# Patient Record
(demographics unavailable — no encounter records)

---

## 2024-10-28 NOTE — ASSESSMENT
[FreeTextEntry1] : Assessment and plan:  1.  Morbid obesity BMI is greater than 40 in fact is 42.97 detailed discussion with patient regarding weight loss program.  Patient is also diabetic therefore we had a long and detailed discussion regarding the weight loss program as stated and she is a candidate for GLP-1 agonist not only for the weight loss but also for diabetes.  Mounjaro prescribed at lowest dose 2.5 subsequently titrate up monthly until she reaches maximum dose.  Patient had multiple questions which I answered to the best of my capability and to the patient's satisfaction.  2.  Diabetes mellitus type 2 due to the combination of obesity and diabetes patient will be starting Mounjaro and she will continue no concentrated sweets consistent carbohydrate diet.  3.  Status post right total knee replacement patient healing well patient is scheduled for orthopedic evaluation of left ankle.  Patient informs me that she will not be having total ankle replacement due to possible side effects and may require multiple surgeries.  4.  Hypertension today's blood pressure 134/76  excellent blood pressure control for patient continue present medical management without change amlodipine 5 mg, losartan hydrochlorothiazide 100-25 mg daily.  5.  Comprehensive blood work drawn in office by examiner.  And at today's visit Fluzone influenza vaccine administered left deltoid.    Total time spent face-to-face and non-face-to-face time 40 minutes the majority of which was spent on counseling and coordination of care.

## 2024-10-28 NOTE — HEALTH RISK ASSESSMENT
[Yes] : Yes [Monthly or less (1 pt)] : Monthly or less (1 point) [1 or 2 (0 pts)] : 1 or 2 (0 points) [Never (0 pts)] : Never (0 points) [No] : In the past 12 months have you used drugs other than those required for medical reasons? No [No falls in past year] : Patient reported no falls in the past year [Little interest or pleasure doing things] : 1) Little interest or pleasure doing things [Feeling down, depressed, or hopeless] : 2) Feeling down, depressed, or hopeless [0] : 2) Feeling down, depressed, or hopeless: Not at all (0) [PHQ-2 Negative - No further assessment needed] : PHQ-2 Negative - No further assessment needed [With Patient/Caregiver] : , with patient/caregiver [Reviewed no changes] : Reviewed, no changes [Designated Healthcare Proxy] : Designated healthcare proxy [Name: ___] : Health Care Proxy's Name: [unfilled]  [Relationship: ___] : Relationship: [unfilled] [Aggressive treatment] : aggressive treatment [I will adhere to the patient's wishes.] : I will adhere to the patient's wishes. [Never] : Never [de-identified] : Occasionally at social events. [Audit-CScore] : 1 [[id number]] : 0 [AdvancecareDate] : 10/24

## 2024-10-28 NOTE — HISTORY OF PRESENT ILLNESS
[FreeTextEntry1] : Follow-up, disease management, health maintenance, reconciliation of medications. [de-identified] : Patient is a 64-year-old female who presents today for follow-up and disease management.  Medical history significant for diabetes mellitus type 2, hyperlipidemia, hypertension, inflammatory arthritis, rosacea and elevated BMI.  Patient recently seen by dermatology history of rosacea.  Presently patient is awake alert and oriented x 3 she is in no acute distress calm, cooperative, well-groomed and nourished.

## 2024-10-28 NOTE — REVIEW OF SYSTEMS
[Joint Pain] : joint pain [Joint Stiffness] : joint stiffness [Negative] : Heme/Lymph [FreeTextEntry9] : Post right total knee replacement healing well complaining of severe left ankle pain [de-identified] : Paresthesia of both hands since carpal tunnel release surgery of left wrist paresthesia has resolved in the left hand persists in right hand patient is scheduled for carpal tunnel release surgery of right wrist February 17.

## 2024-10-28 NOTE — PHYSICAL EXAM
[No Acute Distress] : no acute distress [Well Nourished] : well nourished [Well Developed] : well developed [Well-Appearing] : well-appearing [Normal Voice/Communication] : normal voice/communication [Normal Sclera/Conjunctiva] : normal sclera/conjunctiva [PERRL] : pupils equal round and reactive to light [EOMI] : extraocular movements intact [Normal Outer Ear/Nose] : the outer ears and nose were normal in appearance [Normal Oropharynx] : the oropharynx was normal [No JVD] : no jugular venous distention [No Lymphadenopathy] : no lymphadenopathy [Supple] : supple [Thyroid Normal, No Nodules] : the thyroid was normal and there were no nodules present [No Respiratory Distress] : no respiratory distress  [No Accessory Muscle Use] : no accessory muscle use [Clear to Auscultation] : lungs were clear to auscultation bilaterally [Normal Rate] : normal rate  [Regular Rhythm] : with a regular rhythm [Normal S1, S2] : normal S1 and S2 [No Murmur] : no murmur heard [No Carotid Bruits] : no carotid bruits [No Abdominal Bruit] : a ~M bruit was not heard ~T in the abdomen [No Varicosities] : no varicosities [Pedal Pulses Present] : the pedal pulses are present [No Edema] : there was no peripheral edema [No Palpable Aorta] : no palpable aorta [No Extremity Clubbing/Cyanosis] : no extremity clubbing/cyanosis [Soft] : abdomen soft [Non Tender] : non-tender [Non-distended] : non-distended [No Masses] : no abdominal mass palpated [No HSM] : no HSM [Normal Bowel Sounds] : normal bowel sounds [No CVA Tenderness] : no CVA  tenderness [No Spinal Tenderness] : no spinal tenderness [No Joint Swelling] : no joint swelling [Grossly Normal Strength/Tone] : grossly normal strength/tone [No Rash] : no rash [Coordination Grossly Intact] : coordination grossly intact [No Focal Deficits] : no focal deficits [Normal Gait] : normal gait [Deep Tendon Reflexes (DTR)] : deep tendon reflexes were 2+ and symmetric [Speech Grossly Normal] : speech grossly normal [Memory Grossly Normal] : memory grossly normal [Normal Affect] : the affect was normal [Alert and Oriented x3] : oriented to person, place, and time [Normal Mood] : the mood was normal [Normal Insight/Judgement] : insight and judgment were intact [Comprehensive Foot Exam Normal] : Right and left foot were examined and both feet are normal. No ulcers in either foot. Toes are normal and with full ROM.  Normal tactile sensation with monofilament testing throughout both feet [de-identified] : Status post right total knee replacement incision healed well patient ambulating with cane but doing well, left ankle pain persists. [de-identified] : Incision right knee healed well. [de-identified] : Positive Tinel's right

## 2025-01-11 NOTE — ASSESSMENT
[FreeTextEntry1] : Alert, oriented, well, pleasant.  erythematous scaly papules anterior neck and upper chest surrounding neck. Dermatitis, ? contact. Better after switching to synthetic scarf. start betamethasone cream twice per day for 2 weeks.  f/u if not resolved.  Have scarves cleaned prior to reusing. f/u 4 weeks if not resolved.  erythema cheeks and forehead. Rosacea. increase metronidazole gel to twice per day. Change to decaf every other cup of coffee.  3.5mm flesh colored papule thin right nose tip. Neoplasm uncertain. ?Nevus. Recheck 4 months. Biopsy risks and side effects including scarring discussed.  Describes bright red erythema face with desquamatory scaling immediately after knee surgery.  Pain medicine changed. Resolved. No present elsewhere on body. Rash. ? contact.  May happen at next surgery. Remind doctors of the reaction prior to the operation.  4 months.

## 2025-01-11 NOTE — HISTORY OF PRESENT ILLNESS
[FreeTextEntry1] : itchy rash chest for 3 weeks. Rosacea f/u. bump on right nose tip. Facial rash after knee surgery recently. Next knee surgery pending. [de-identified] : New pashmina scarves on neck and chest then rash began. Scarves not dry cleaned before wearing. Hot showers and 4 large cups coffee daily. More than 1 year bump right nose tip without change. Bright red face with peeling immediately after surgey, told due to oxycodone. Resolved.

## 2025-02-05 NOTE — REVIEW OF SYSTEMS
[Joint Pain] : joint pain [Joint Stiffness] : joint stiffness [Negative] : Heme/Lymph [FreeTextEntry9] : Post right total knee replacement healing well complaining of severe left ankle pain [de-identified] : Paresthesia of both hands since carpal tunnel release surgery of left wrist paresthesia has resolved in the left hand persists in right hand patient is scheduled for carpal tunnel release surgery of right wrist February 17.

## 2025-02-05 NOTE — ASSESSMENT
[FreeTextEntry1] : Assessment and plan:  1.  Morbid obesity patient has been on Mounjaro with good results has lost a total of 23 pounds continue present medical management and also diet.  Patient will be having left total knee replacement in 2 weeks at [facility] Mounjaro will be held.  2.  Diabetes mellitus type 2 due to the combination of obesity and diabetes patient will be starting Mounjaro and she will continue no concentrated sweets consistent carbohydrate diet.  3.  Status post right total knee replacement patient healing well patient is scheduled for orthopedic evaluation of left ankle.  Patient informs me that she will not be having total ankle replacement due to possible side effects and may require multiple surgeries.  4.  Hypertension today's blood pressure 134/76  excellent blood pressure control for patient continue present medical management without change amlodipine 5 mg, losartan hydrochlorothiazide 100-25 mg daily.  5.  Comprehensive blood work drawn at [facility] subsequently hemoglobin A1c has dropped to 5.7.    Total time spent face-to-face and non-face-to-face time 40 minutes the majority of which was spent on counseling and coordination of care.

## 2025-02-05 NOTE — COUNSELING

## 2025-02-05 NOTE — HISTORY OF PRESENT ILLNESS
[FreeTextEntry1] : Follow-up and disease management [de-identified] : Patient is a 64-year-old female who presents today for follow-up and disease management.  Medical history significant for diabetes mellitus type 2, hyperlipidemia, hypertension, inflammatory arthritis, rosacea and elevated BMI.  Patient recently seen by dermatology history of rosacea.  Presently patient is awake alert and oriented x 3 she is in no acute distress calm, cooperative, well-groomed and nourished.

## 2025-02-05 NOTE — PHYSICAL EXAM
[No Acute Distress] : no acute distress [Well Nourished] : well nourished [Well Developed] : well developed [Well-Appearing] : well-appearing [Normal Voice/Communication] : normal voice/communication [Normal Sclera/Conjunctiva] : normal sclera/conjunctiva [PERRL] : pupils equal round and reactive to light [EOMI] : extraocular movements intact [Normal Outer Ear/Nose] : the outer ears and nose were normal in appearance [Normal Oropharynx] : the oropharynx was normal [No JVD] : no jugular venous distention [No Lymphadenopathy] : no lymphadenopathy [Supple] : supple [Thyroid Normal, No Nodules] : the thyroid was normal and there were no nodules present [No Respiratory Distress] : no respiratory distress  [No Accessory Muscle Use] : no accessory muscle use [Clear to Auscultation] : lungs were clear to auscultation bilaterally [Normal Rate] : normal rate  [Regular Rhythm] : with a regular rhythm [Normal S1, S2] : normal S1 and S2 [No Murmur] : no murmur heard [No Carotid Bruits] : no carotid bruits [No Abdominal Bruit] : a ~M bruit was not heard ~T in the abdomen [No Varicosities] : no varicosities [Pedal Pulses Present] : the pedal pulses are present [No Edema] : there was no peripheral edema [No Palpable Aorta] : no palpable aorta [No Extremity Clubbing/Cyanosis] : no extremity clubbing/cyanosis [Soft] : abdomen soft [Non Tender] : non-tender [Non-distended] : non-distended [No Masses] : no abdominal mass palpated [No HSM] : no HSM [Normal Bowel Sounds] : normal bowel sounds [No CVA Tenderness] : no CVA  tenderness [No Spinal Tenderness] : no spinal tenderness [No Joint Swelling] : no joint swelling [Grossly Normal Strength/Tone] : grossly normal strength/tone [No Rash] : no rash [Coordination Grossly Intact] : coordination grossly intact [No Focal Deficits] : no focal deficits [Normal Gait] : normal gait [Deep Tendon Reflexes (DTR)] : deep tendon reflexes were 2+ and symmetric [Speech Grossly Normal] : speech grossly normal [Memory Grossly Normal] : memory grossly normal [Normal Affect] : the affect was normal [Alert and Oriented x3] : oriented to person, place, and time [Normal Mood] : the mood was normal [Normal Insight/Judgement] : insight and judgment were intact [Comprehensive Foot Exam Normal] : Right and left foot were examined and both feet are normal. No ulcers in either foot. Toes are normal and with full ROM.  Normal tactile sensation with monofilament testing throughout both feet [de-identified] : Status post right total knee replacement incision healed well patient ambulating with cane but doing well, left ankle pain persists. [de-identified] : Incision right knee healed well. [de-identified] : Positive Tinel's right

## 2025-02-05 NOTE — HEALTH RISK ASSESSMENT
[Yes] : Yes [Monthly or less (1 pt)] : Monthly or less (1 point) [1 or 2 (0 pts)] : 1 or 2 (0 points) [Never (0 pts)] : Never (0 points) [No] : In the past 12 months have you used drugs other than those required for medical reasons? No [No falls in past year] : Patient reported no falls in the past year [Little interest or pleasure doing things] : 1) Little interest or pleasure doing things [Feeling down, depressed, or hopeless] : 2) Feeling down, depressed, or hopeless [0] : 2) Feeling down, depressed, or hopeless: Not at all (0) [PHQ-2 Negative - No further assessment needed] : PHQ-2 Negative - No further assessment needed [With Patient/Caregiver] : , with patient/caregiver [Reviewed no changes] : Reviewed, no changes [Designated Healthcare Proxy] : Designated healthcare proxy [Name: ___] : Health Care Proxy's Name: [unfilled]  [Relationship: ___] : Relationship: [unfilled] [Aggressive treatment] : aggressive treatment [I will adhere to the patient's wishes.] : I will adhere to the patient's wishes. [Never] : Never [Audit-CScore] : 1 [[id number]] : 0 [AdvancecareDate] : 02/25

## 2025-02-05 NOTE — REVIEW OF SYSTEMS
[Joint Pain] : joint pain [Joint Stiffness] : joint stiffness [Negative] : Heme/Lymph [FreeTextEntry9] : Post right total knee replacement healing well complaining of severe left ankle pain [de-identified] : Paresthesia of both hands since carpal tunnel release surgery of left wrist paresthesia has resolved in the left hand persists in right hand patient is scheduled for carpal tunnel release surgery of right wrist February 17.

## 2025-02-05 NOTE — HISTORY OF PRESENT ILLNESS
[FreeTextEntry1] : Follow-up and disease management [de-identified] : Patient is a 64-year-old female who presents today for follow-up and disease management.  Medical history significant for diabetes mellitus type 2, hyperlipidemia, hypertension, inflammatory arthritis, rosacea and elevated BMI.  Patient recently seen by dermatology history of rosacea.  Presently patient is awake alert and oriented x 3 she is in no acute distress calm, cooperative, well-groomed and nourished.

## 2025-02-05 NOTE — ASSESSMENT
[FreeTextEntry1] : Assessment and plan:  1.  Morbid obesity patient has been on Mounjaro with good results has lost a total of 23 pounds continue present medical management and also diet.  Patient will be having left total knee replacement in 2 weeks at [facility] Mounjaro will be held.  2.  Diabetes mellitus type 2 due to the combination of obesity and diabetes patient will be starting Mounjaro and she will continue no concentrated sweets consistent carbohydrate diet.  3.  Status post right total knee replacement patient healing well patient is scheduled for orthopedic evaluation of left ankle.  Patient informs me that she will not be having total ankle replacement due to possible side effects and may require multiple surgeries.  4.  Hypertension today's blood pressure 134/76  excellent blood pressure control for patient continue present medical management without change amlodipine 5 mg, losartan hydrochlorothiazide 100-25 mg daily.  5.  Comprehensive blood work drawn at [facility] subsequently hemoglobin A1c has dropped to 5.7.    Total time spent face-to-face and non-face-to-face time 40 minutes the majority of which was spent on counseling and coordination of care.

## 2025-02-05 NOTE — HEALTH RISK ASSESSMENT
[Yes] : Yes [Monthly or less (1 pt)] : Monthly or less (1 point) [1 or 2 (0 pts)] : 1 or 2 (0 points) [Never (0 pts)] : Never (0 points) [No] : In the past 12 months have you used drugs other than those required for medical reasons? No [No falls in past year] : Patient reported no falls in the past year [Little interest or pleasure doing things] : 1) Little interest or pleasure doing things [Feeling down, depressed, or hopeless] : 2) Feeling down, depressed, or hopeless [0] : 2) Feeling down, depressed, or hopeless: Not at all (0) [PHQ-2 Negative - No further assessment needed] : PHQ-2 Negative - No further assessment needed [With Patient/Caregiver] : , with patient/caregiver [Reviewed no changes] : Reviewed, no changes [Designated Healthcare Proxy] : Designated healthcare proxy [Name: ___] : Health Care Proxy's Name: [unfilled]  [Relationship: ___] : Relationship: [unfilled] [Aggressive treatment] : aggressive treatment [I will adhere to the patient's wishes.] : I will adhere to the patient's wishes. [Never] : Never [Audit-CScore] : 1 [[id number]] : 0 [AdvancecareDate] : 02/25

## 2025-02-05 NOTE — PHYSICAL EXAM
[No Acute Distress] : no acute distress [Well Nourished] : well nourished [Well Developed] : well developed [Well-Appearing] : well-appearing [Normal Voice/Communication] : normal voice/communication [Normal Sclera/Conjunctiva] : normal sclera/conjunctiva [PERRL] : pupils equal round and reactive to light [EOMI] : extraocular movements intact [Normal Outer Ear/Nose] : the outer ears and nose were normal in appearance [Normal Oropharynx] : the oropharynx was normal [No JVD] : no jugular venous distention [No Lymphadenopathy] : no lymphadenopathy [Supple] : supple [Thyroid Normal, No Nodules] : the thyroid was normal and there were no nodules present [No Respiratory Distress] : no respiratory distress  [No Accessory Muscle Use] : no accessory muscle use [Clear to Auscultation] : lungs were clear to auscultation bilaterally [Normal Rate] : normal rate  [Regular Rhythm] : with a regular rhythm [Normal S1, S2] : normal S1 and S2 [No Murmur] : no murmur heard [No Carotid Bruits] : no carotid bruits [No Abdominal Bruit] : a ~M bruit was not heard ~T in the abdomen [No Varicosities] : no varicosities [Pedal Pulses Present] : the pedal pulses are present [No Edema] : there was no peripheral edema [No Palpable Aorta] : no palpable aorta [No Extremity Clubbing/Cyanosis] : no extremity clubbing/cyanosis [Soft] : abdomen soft [Non Tender] : non-tender [Non-distended] : non-distended [No Masses] : no abdominal mass palpated [No HSM] : no HSM [Normal Bowel Sounds] : normal bowel sounds [No CVA Tenderness] : no CVA  tenderness [No Spinal Tenderness] : no spinal tenderness [No Joint Swelling] : no joint swelling [Grossly Normal Strength/Tone] : grossly normal strength/tone [No Rash] : no rash [Coordination Grossly Intact] : coordination grossly intact [No Focal Deficits] : no focal deficits [Normal Gait] : normal gait [Deep Tendon Reflexes (DTR)] : deep tendon reflexes were 2+ and symmetric [Speech Grossly Normal] : speech grossly normal [Memory Grossly Normal] : memory grossly normal [Normal Affect] : the affect was normal [Alert and Oriented x3] : oriented to person, place, and time [Normal Mood] : the mood was normal [Normal Insight/Judgement] : insight and judgment were intact [Comprehensive Foot Exam Normal] : Right and left foot were examined and both feet are normal. No ulcers in either foot. Toes are normal and with full ROM.  Normal tactile sensation with monofilament testing throughout both feet [de-identified] : Status post right total knee replacement incision healed well patient ambulating with cane but doing well, left ankle pain persists. [de-identified] : Incision right knee healed well. [de-identified] : Positive Tinel's right

## 2025-06-25 NOTE — ASSESSMENT
[FreeTextEntry1] : Assessment and plan:  1.  Morbid obesity patient did not tolerate the higher dose of Mounjaro but was tolerating the lower dose in fact had a 20 pound weight loss once the Mounjaro was increased patient did not tolerated due to GI side effects we will reattempt the low-dose and see if we can titrate slowly.  2.  Diabetes mellitus type 2 due to the combination of obesity and diabetes patient will be starting Mounjaro and she will continue no concentrated sweets consistent carbohydrate diet.  3.  Status post left  total knee replacement patient healing well patient is scheduled for orthopedic evaluation of left ankle.  Patient informs me that she will not be having total ankle replacement due to possible side effects and may require multiple surgeries.  Patient has appointment with chair of ankle and foot surgery at [facility] for second opinion.  4.  Hypertension today's blood pressure 138/76  excellent blood pressure control for patient continue present medical management without change amlodipine 5 mg, losartan hydrochlorothiazide 100-25 mg daily.  5.  Comprehensive blood work drawn at [facility] subsequently hemoglobin A1c has dropped to 5.7.  6.  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    Total time spent face-to-face and non-face-to-face time 40 minutes the majority of which was spent on counseling and coordination of care.

## 2025-06-25 NOTE — REVIEW OF SYSTEMS
[Joint Pain] : joint pain [Joint Stiffness] : joint stiffness [Negative] : Heme/Lymph [FreeTextEntry9] : Post right total knee replacement healing well complaining of severe left ankle pain [de-identified] : Paresthesia of both hands since carpal tunnel release surgery of left wrist paresthesia has resolved in the left hand persists in right hand patient is scheduled for carpal tunnel release surgery of right wrist February 17.

## 2025-06-25 NOTE — PHYSICAL EXAM
[No Acute Distress] : no acute distress [Well Nourished] : well nourished [Well Developed] : well developed [Well-Appearing] : well-appearing [Normal Voice/Communication] : normal voice/communication [Normal Sclera/Conjunctiva] : normal sclera/conjunctiva [PERRL] : pupils equal round and reactive to light [EOMI] : extraocular movements intact [Normal Outer Ear/Nose] : the outer ears and nose were normal in appearance [Normal Oropharynx] : the oropharynx was normal [No JVD] : no jugular venous distention [No Lymphadenopathy] : no lymphadenopathy [Supple] : supple [Thyroid Normal, No Nodules] : the thyroid was normal and there were no nodules present [No Respiratory Distress] : no respiratory distress  [No Accessory Muscle Use] : no accessory muscle use [Clear to Auscultation] : lungs were clear to auscultation bilaterally [Normal Rate] : normal rate  [Regular Rhythm] : with a regular rhythm [Normal S1, S2] : normal S1 and S2 [No Murmur] : no murmur heard [No Carotid Bruits] : no carotid bruits [No Abdominal Bruit] : a ~M bruit was not heard ~T in the abdomen [No Varicosities] : no varicosities [Pedal Pulses Present] : the pedal pulses are present [No Edema] : there was no peripheral edema [No Palpable Aorta] : no palpable aorta [No Extremity Clubbing/Cyanosis] : no extremity clubbing/cyanosis [Soft] : abdomen soft [Non Tender] : non-tender [Non-distended] : non-distended [No Masses] : no abdominal mass palpated [No HSM] : no HSM [Normal Bowel Sounds] : normal bowel sounds [No CVA Tenderness] : no CVA  tenderness [No Spinal Tenderness] : no spinal tenderness [No Joint Swelling] : no joint swelling [Grossly Normal Strength/Tone] : grossly normal strength/tone [No Rash] : no rash [Coordination Grossly Intact] : coordination grossly intact [No Focal Deficits] : no focal deficits [Normal Gait] : normal gait [Deep Tendon Reflexes (DTR)] : deep tendon reflexes were 2+ and symmetric [Speech Grossly Normal] : speech grossly normal [Memory Grossly Normal] : memory grossly normal [Normal Affect] : the affect was normal [Alert and Oriented x3] : oriented to person, place, and time [Normal Mood] : the mood was normal [Normal Insight/Judgement] : insight and judgment were intact [Comprehensive Foot Exam Normal] : Right and left foot were examined and both feet are normal. No ulcers in either foot. Toes are normal and with full ROM.  Normal tactile sensation with monofilament testing throughout both feet [de-identified] : Status post right total knee replacement incision healed well patient ambulating with cane but doing well, left ankle pain persists. [de-identified] : Incision right knee healed well. [de-identified] : Positive Tinel's right

## 2025-06-25 NOTE — HEALTH RISK ASSESSMENT
[Yes] : Yes [Monthly or less (1 pt)] : Monthly or less (1 point) [1 or 2 (0 pts)] : 1 or 2 (0 points) [Never (0 pts)] : Never (0 points) [No] : In the past 12 months have you used drugs other than those required for medical reasons? No [One fall no injury in past year] : Patient reported one fall in the past year without injury [Little interest or pleasure doing things] : 1) Little interest or pleasure doing things [Feeling down, depressed, or hopeless] : 2) Feeling down, depressed, or hopeless [0] : 2) Feeling down, depressed, or hopeless: Not at all (0) [PHQ-2 Negative - No further assessment needed] : PHQ-2 Negative - No further assessment needed [With Patient/Caregiver] : , with patient/caregiver [Designated Healthcare Proxy] : Designated healthcare proxy [Name: ___] : Health Care Proxy's Name: [unfilled]  [Relationship: ___] : Relationship: [unfilled] [Aggressive treatment] : aggressive treatment [I will adhere to the patient's wishes.] : I will adhere to the patient's wishes. [Never] : Never [Audit-CScore] : 1 [[id number]] : 0 [AdvancecareDate] : 06/25

## 2025-06-25 NOTE — HISTORY OF PRESENT ILLNESS
[FreeTextEntry1] : Status post left total knee replacement patient does have some residual left ankle pain will be seeing ankle and foot specialist at \[facility]\"". [de-identified] : Patient is a 65-year-old female who presents today for follow-up and disease management.  Medical history significant for diabetes mellitus type 2, hyperlipidemia, hypertension, inflammatory arthritis, rosacea and elevated BMI.  Presently patient is awake alert and oriented x 3 she is in no acute distress calm, cooperative, well-groomed and nourished.